# Patient Record
Sex: MALE
[De-identification: names, ages, dates, MRNs, and addresses within clinical notes are randomized per-mention and may not be internally consistent; named-entity substitution may affect disease eponyms.]

---

## 2021-03-17 PROBLEM — Z00.00 ENCOUNTER FOR PREVENTIVE HEALTH EXAMINATION: Status: ACTIVE | Noted: 2021-03-17

## 2021-03-18 ENCOUNTER — APPOINTMENT (OUTPATIENT)
Dept: UROLOGY | Facility: CLINIC | Age: 51
End: 2021-03-18
Payer: COMMERCIAL

## 2021-03-18 VITALS
DIASTOLIC BLOOD PRESSURE: 86 MMHG | BODY MASS INDEX: 24.8 KG/M2 | WEIGHT: 158 LBS | OXYGEN SATURATION: 98 % | HEART RATE: 105 BPM | SYSTOLIC BLOOD PRESSURE: 134 MMHG | HEIGHT: 67 IN

## 2021-03-18 DIAGNOSIS — F17.200 NICOTINE DEPENDENCE, UNSPECIFIED, UNCOMPLICATED: ICD-10-CM

## 2021-03-18 DIAGNOSIS — F52.4 PREMATURE EJACULATION: ICD-10-CM

## 2021-03-18 DIAGNOSIS — Z86.39 PERSONAL HISTORY OF OTHER ENDOCRINE, NUTRITIONAL AND METABOLIC DISEASE: ICD-10-CM

## 2021-03-18 PROCEDURE — 99204 OFFICE O/P NEW MOD 45 MIN: CPT

## 2021-03-18 PROCEDURE — 99072 ADDL SUPL MATRL&STAF TM PHE: CPT

## 2021-03-18 PROCEDURE — 36415 COLL VENOUS BLD VENIPUNCTURE: CPT

## 2021-03-18 NOTE — REVIEW OF SYSTEMS
[both] : pain during and after intercourse [denies] : denies pain with orgasm [Poor quality erections] : Poor quality erections [base] : pain in base of penis [Negative] : Heme/Lymph

## 2021-03-18 NOTE — LETTER BODY
[Dear  ___] : Dear  [unfilled], [Consult Letter:] : I had the pleasure of evaluating your patient, [unfilled]. [( Thank you for referring [unfilled] for consultation for _____ )] : Thank you for referring [unfilled] for consultation for [unfilled] [Please see my note below.] : Please see my note below. [Consult Closing:] : Thank you very much for allowing me to participate in the care of this patient.  If you have any questions, please do not hesitate to contact me. [Sincerely,] : Sincerely, [FreeTextEntry3] : Domo Jones MD\par  of Urology\par [facility]\par \par Offices:\par [facility] @\par [address], [place] [address]\par and\par [address], [place] [address], [address]\par and\par [address] [address]\par \par TEL: [phone]\par FAX: [phone]

## 2021-03-18 NOTE — HISTORY OF PRESENT ILLNESS
[FreeTextEntry1] : 49 yo male presents for Erectile dysfunction. \par For last 5 years has worsening erectile function. \par Rates erections as 0-3/5. Reports decreased libido. Has tried Phosphodiesterase 5 inhibitors in the past- good response. \par Also complaining of premature ejaculation.  \par Denies any difficulty with urination. \par Denies dysuria, hematuria, lower abdominal or flank pain, nausea, vomiting, fever, chills or rigors. \par No family history of Prostate cancer. \par Smoker: 1-2 cigarettes a day for 20 years.

## 2021-03-18 NOTE — ASSESSMENT
[FreeTextEntry1] : Discussed simple steps: Masturbating before intercourse, Use of Condoms, Pelvic floor exercises: Kegel exercises and Pause-squeeze technique. \par Discussed common treatment options for premature ejaculation including behavioral techniques, topical anesthetics and medications: antidepressants like Paxil. \par \par Erectile dysfunction:\par Reviewed pathophysiology and differential diagnosis of erectile dysfunction with the patient. Discussed lifestyle changes. \par The patient was made aware that the current therapies for erectile dysfunction consisting of oral phosphodiesterase type 5 inhibitors, intra-urethral alprostadil, intracavernous vasoactive drug injection, vacuum constriction devices and penile prosthesis implantation. Relative risks and benefits, were discussed. All questions were answered.\par Will get Total, Free and Bio available Testosterone. Will inform results.\par \par Wants to try Cialis/Tadalafil. Discussed proper use. \par Recommended that patient start with 5 mg and take it daily. \par If partial response can increase to 10 mg and up to 20 mg, to be taken as needed and not on 2 consecutive days.\par The patient understands that these medications are not initiators of erection and that he will still require sexual stimulation. \par He was advised to take the medication 30-60 minutes prior to sexual activity and that the efficacy of the medication is decreased following a high-fat meal or concurrent use of alcohol. \par Explained the common adverse effects of therapy including, but not limited to headache, flushing, upset stomach, nasal congestion, abnormal vision, back pain, and myalgia. Asked pt to stop taking the medicine if he develops chest pain, visual or auditory disturbance. \par Explained priapism- if erection does not go down after ejaculation or lasts more than 4 hrs to present to emergency room. \par Asked to use Good Rx coupon, instructions given. \par \par Return to office in 2 months or sooner if any issues.

## 2021-03-18 NOTE — PHYSICAL EXAM
[Normal Appearance] : normal appearance [General Appearance - In No Acute Distress] : no acute distress [FreeTextEntry1] : normal peripheral circulation  [] : no respiratory distress [Abdomen Soft] : soft [Abdomen Tenderness] : non-tender [Costovertebral Angle Tenderness] : no ~M costovertebral angle tenderness [Urethral Meatus] : meatus normal [Penis Abnormality] : normal circumcised penis [Scrotum] : the scrotum was normal [Epididymis] : the epididymides were normal [Testes Tenderness] : no tenderness of the testes [Testes Mass (___cm)] : there were no testicular masses [Normal Station and Gait] : the gait and station were normal for the patient's age [Skin Color & Pigmentation] : normal skin color and pigmentation [No Focal Deficits] : no focal deficits [Oriented To Time, Place, And Person] : oriented to person, place, and time

## 2021-04-13 DIAGNOSIS — E29.1 TESTICULAR HYPOFUNCTION: ICD-10-CM

## 2021-04-13 LAB
TESTOSTERONE FREE/WEAKLY BND: 66.5 NG/DL
TESTOSTERONE TOTAL S: 230 NG/DL
TESTOSTERONE, % FREE/WEAKLY BND: 28.9 %

## 2021-05-14 ENCOUNTER — TRANSCRIPTION ENCOUNTER (OUTPATIENT)
Age: 51
End: 2021-05-14

## 2021-05-17 LAB
FSH SERPL-MCNC: 8.6 IU/L
LH SERPL-ACNC: 8.8 IU/L
PROLACTIN SERPL-MCNC: 9.4 NG/ML

## 2021-05-19 ENCOUNTER — TRANSCRIPTION ENCOUNTER (OUTPATIENT)
Age: 51
End: 2021-05-19

## 2021-05-19 LAB
TESTOSTERONE FREE/WEAKLY BND: 122.4 NG/DL
TESTOSTERONE TOTAL S: 339 NG/DL
TESTOSTERONE, % FREE/WEAKLY BND: 36.1 %

## 2022-06-01 ENCOUNTER — APPOINTMENT (OUTPATIENT)
Dept: UROLOGY | Facility: CLINIC | Age: 52
End: 2022-06-01
Payer: COMMERCIAL

## 2022-06-01 VITALS
SYSTOLIC BLOOD PRESSURE: 122 MMHG | WEIGHT: 165 LBS | DIASTOLIC BLOOD PRESSURE: 83 MMHG | BODY MASS INDEX: 25.9 KG/M2 | HEIGHT: 67 IN | HEART RATE: 93 BPM | OXYGEN SATURATION: 100 %

## 2022-06-01 PROCEDURE — 99213 OFFICE O/P EST LOW 20 MIN: CPT

## 2022-06-02 NOTE — HISTORY OF PRESENT ILLNESS
[FreeTextEntry1] : 53 yo male presents for follow up. \par With Tadalafil 5 mg gets 3/5 response. Takes as needed. \par \par Seen on 3/18/2021 for Erectile dysfunction. \par For last 5 years had worsening erectile function. \par Rated erections as 0-3/5. Reported decreased libido. Had tried Phosphodiesterase 5 inhibitors in the past- good response. \par Also complained of premature ejaculation.  \par Denied any difficulty with urination. \par Denied dysuria, hematuria, lower abdominal or flank pain, nausea, vomiting, fever, chills or rigors. \par No family history of Prostate cancer. \par Smoker: 1-2 cigarettes a day for 20 years.

## 2022-06-02 NOTE — LETTER BODY
[Dear  ___] : Dear  [unfilled], [Courtesy Letter:] : I had the pleasure of seeing your patient, [unfilled], in my office today. [Please see my note below.] : Please see my note below. [Sincerely,] : Sincerely, [FreeTextEntry3] : Domo Jones MD\par  of Urology\par [facility]\par \par Offices:\par [facility] @\par [address], [place] [address]\par and\par [address], [place] [address], [address]\par and\par [address] [address]\par \par TEL: [phone]\par FAX: [phone]

## 2022-06-02 NOTE — ASSESSMENT
[FreeTextEntry1] : Erectile dysfunction:\par Will try Tadalafil 10 mg. \par Wants 5 mg script. \par \par Return to office in 1 year or sooner if any issues.

## 2023-02-28 ENCOUNTER — APPOINTMENT (OUTPATIENT)
Dept: UROLOGY | Facility: CLINIC | Age: 53
End: 2023-02-28

## 2023-03-14 ENCOUNTER — NON-APPOINTMENT (OUTPATIENT)
Age: 53
End: 2023-03-14

## 2023-03-15 ENCOUNTER — APPOINTMENT (OUTPATIENT)
Dept: UROLOGY | Facility: CLINIC | Age: 53
End: 2023-03-15
Payer: COMMERCIAL

## 2023-03-15 VITALS
WEIGHT: 165 LBS | BODY MASS INDEX: 25.9 KG/M2 | TEMPERATURE: 97.6 F | DIASTOLIC BLOOD PRESSURE: 83 MMHG | SYSTOLIC BLOOD PRESSURE: 131 MMHG | HEIGHT: 67 IN

## 2023-03-15 PROCEDURE — 99214 OFFICE O/P EST MOD 30 MIN: CPT | Mod: 25

## 2023-03-15 PROCEDURE — 51798 US URINE CAPACITY MEASURE: CPT

## 2023-03-15 RX ORDER — SULFAMETHOXAZOLE AND TRIMETHOPRIM 800; 160 MG/1; MG/1
800-160 TABLET ORAL TWICE DAILY
Qty: 28 | Refills: 0 | Status: ACTIVE | COMMUNITY
Start: 2023-03-15 | End: 1900-01-01

## 2023-03-15 NOTE — HISTORY OF PRESENT ILLNESS
[FreeTextEntry1] : 53 yo male presents for dysuria. \par For last 3 months has been having dysuria, initially on and off, since last 1 month everyday. \par Was given 10 day course of Ciprofloxacin, no difference, stopped 3 days ago. Urine test was negative for infection. \par Has reasonable stream, daytime frequency is every 2 hours or so and nocturia 0 to 1 x. \par Denies hesitancy, straining, intermittency, urgency, incontinence, sense of incomplete emptying. \par Denies hematuria, lower abdominal or flank pain, nausea, vomiting, fever, chills or rigors. \par With Tadalafil 5 mg gets 3/5 response. Uses PRN. \par \par Past occasional smoker.\par Occupation: [employer]. \par \par Seen on 3/18/2021 for Erectile dysfunction. \par For last 5 years had worsening erectile function. \par Rated erections as 0-3/5. Reported decreased libido. Had tried Phosphodiesterase 5 inhibitors in the past- good response. \par Also complained of premature ejaculation.  \par Denied any difficulty with urination. \par Denied dysuria, hematuria, lower abdominal or flank pain, nausea, vomiting, fever, chills or rigors. \par No family history of Prostate cancer. \par Smoker: 1 to 2 cigarettes a day for 20 years.

## 2023-03-15 NOTE — LETTER BODY
[Dear  ___] : Dear  [unfilled], [Courtesy Letter:] : I had the pleasure of seeing your patient, [unfilled], in my office today. [Please see my note below.] : Please see my note below. [Sincerely,] : Sincerely, [FreeTextEntry3] : Domo Jones MD\par  of Urology\par [facility]\par \par [facility]\par Offices:\par [address], [facility]\par [address] [address]\par [address] [facility], [address]\par \par TEL: [phone]\par FAX: [phone]

## 2023-03-15 NOTE — PHYSICAL EXAM
[Urethral Meatus] : meatus normal [Penis Abnormality] : normal circumcised penis [Scrotum] : the scrotum was normal [Testes Tenderness] : no tenderness of the testes [Testes Mass (___cm)] : there were no testicular masses [No Prostate Nodules] : no prostate nodules [Prostate Size ___ (0-4)] : prostate size [unfilled] (scale: 0-4) [Prostate Tenderness] : was tender [Normal Appearance] : normal appearance [General Appearance - In No Acute Distress] : no acute distress [Abdomen Soft] : soft [Abdomen Tenderness] : non-tender [FreeTextEntry1] : normal peripheral circulation  [] : no respiratory distress [Oriented To Time, Place, And Person] : oriented to person, place, and time [Normal Station and Gait] : the gait and station were normal for the patient's age

## 2023-03-15 NOTE — ASSESSMENT
[FreeTextEntry1] : Dysuria:\par PVR: 79 ml. \par Will get Urinalysis, Urine culture and Urine cytology. \par \par Prostatitis: \par Discussed concern for Prostatitis.  \par Will start Bactrim. \par Recommended to abstain from sexual activity. \par \par Erectile dysfunction:  \par Recommended to try Tadalafil 10 mg PRN. \par \par Return to office in 4 weeks or sooner if any issues: will do Uroflo/PVR.

## 2023-03-16 LAB
APPEARANCE: CLEAR
BACTERIA: NEGATIVE
BILIRUBIN URINE: NEGATIVE
BLOOD URINE: NEGATIVE
COLOR: NORMAL
GLUCOSE QUALITATIVE U: ABNORMAL
HYALINE CASTS: 0 /LPF
KETONES URINE: NEGATIVE
LEUKOCYTE ESTERASE URINE: NEGATIVE
MICROSCOPIC-UA: NORMAL
NITRITE URINE: NEGATIVE
PH URINE: 6
PROTEIN URINE: NEGATIVE
RED BLOOD CELLS URINE: 1 /HPF
SPECIFIC GRAVITY URINE: 1.03
SQUAMOUS EPITHELIAL CELLS: 0 /HPF
URINE CYTOLOGY: NORMAL
UROBILINOGEN URINE: NORMAL
WHITE BLOOD CELLS URINE: 0 /HPF

## 2023-03-17 LAB — BACTERIA UR CULT: NORMAL

## 2023-04-06 ENCOUNTER — APPOINTMENT (OUTPATIENT)
Dept: UROLOGY | Facility: CLINIC | Age: 53
End: 2023-04-06

## 2023-05-25 ENCOUNTER — NON-APPOINTMENT (OUTPATIENT)
Age: 53
End: 2023-05-25

## 2023-05-26 ENCOUNTER — NON-APPOINTMENT (OUTPATIENT)
Age: 53
End: 2023-05-26

## 2023-06-01 ENCOUNTER — APPOINTMENT (OUTPATIENT)
Dept: UROLOGY | Facility: CLINIC | Age: 53
End: 2023-06-01
Payer: COMMERCIAL

## 2023-06-01 VITALS
HEART RATE: 94 BPM | OXYGEN SATURATION: 99 % | BODY MASS INDEX: 25.43 KG/M2 | DIASTOLIC BLOOD PRESSURE: 78 MMHG | SYSTOLIC BLOOD PRESSURE: 124 MMHG | WEIGHT: 162 LBS | HEIGHT: 67 IN

## 2023-06-01 DIAGNOSIS — N41.0 ACUTE PROSTATITIS: ICD-10-CM

## 2023-06-01 PROCEDURE — 51741 ELECTRO-UROFLOWMETRY FIRST: CPT

## 2023-06-01 PROCEDURE — 99214 OFFICE O/P EST MOD 30 MIN: CPT

## 2023-06-01 RX ORDER — DOXYCYCLINE 100 MG/1
100 CAPSULE ORAL TWICE DAILY
Qty: 28 | Refills: 0 | Status: ACTIVE | COMMUNITY
Start: 2023-06-01 | End: 1900-01-01

## 2023-06-04 NOTE — PHYSICAL EXAM
[Normal Appearance] : normal appearance [General Appearance - In No Acute Distress] : no acute distress [Abdomen Tenderness] : non-tender [Abdomen Soft] : soft [FreeTextEntry1] : normal peripheral circulation  [] : no respiratory distress [Oriented To Time, Place, And Person] : oriented to person, place, and time [Normal Station and Gait] : the gait and station were normal for the patient's age

## 2023-06-04 NOTE — ASSESSMENT
[FreeTextEntry1] : Reviewed records.\par Discussed labs.\par \par Benign Prostatic Hyperplasia:\par Erectile dysfunction:\par See Uroflo and PVR.\par Discussed treatment options. Will try Tadalafil 5 mg daily. \par Explained the common adverse effects of therapy including, but not limited to headache, flushing, upset stomach, nasal congestion, abnormal vision, back pain, and myalgia. Asked pt to stop taking the medicine if he develops chest pain, visual or auditory disturbance. Explained priapism- if erection does not go down after ejaculation or lasts more than 4 hrs to present to emergency room. \par \par Dysuria:\par Prostatitis:\par Start Doxycycline. \par Discussed if continues to have bother will do Cystoscopy. \par \par Return to office in 3 months or sooner if any issues.

## 2023-06-04 NOTE — HISTORY OF PRESENT ILLNESS
[FreeTextEntry1] : 54 yo male presents for follow up. \par Was better with Antibiotics and after for 1 month, again has dysuria. \par Otherwise same urination. \par With Bactrim had erectile dysfunction. \par Takes Tadalafil 5 mg as needed and feels warm feeling in the body. \par \par ARACELIS: 3/15/23. \par \par Seen on 3/15/23 for dysuria. \par For last 3 months had been having dysuria, initially on and off, since last 1 month everyday. \par Was given 10 day course of Ciprofloxacin, no difference, stopped 3 days ago. Urine test was negative for infection. \par Had reasonable stream, daytime frequency is every 2 hours or so and nocturia 0 to 1 x. \par Denied hesitancy, straining, intermittency, urgency, incontinence, sense of incomplete emptying. \par Denied hematuria, lower abdominal or flank pain, nausea, vomiting, fever, chills or rigors. \par With Tadalafil 5 mg got 3/5 response. Used PRN. \par \par Past occasional smoker.\par Occupation: [employer]. \par \par Seen on 3/18/2021 for Erectile dysfunction. \par For last 5 years had worsening erectile function. \par Rated erections as 0-3/5. Reported decreased libido. Had tried Phosphodiesterase 5 inhibitors in the past- good response. \par Also complained of premature ejaculation.  \par Denied any difficulty with urination. \par Denied dysuria, hematuria, lower abdominal or flank pain, nausea, vomiting, fever, chills or rigors. \par No family history of Prostate cancer. \par Smoker: 1 to 2 cigarettes a day for 20 years.

## 2023-06-04 NOTE — LETTER BODY
[Dear  ___] : Dear  [unfilled], [Courtesy Letter:] : I had the pleasure of seeing your patient, [unfilled], in my office today. [Please see my note below.] : Please see my note below. [Sincerely,] : Sincerely, [FreeTextEntry3] : Domo Jones MD\par  of Urology\par [facility]\par \par [facility]\par Offices:\par [address] [facility]\par [address] [address]\par [address] [facility], [address]\par \par TEL: [phone]\par FAX: [phone]

## 2023-10-10 ENCOUNTER — APPOINTMENT (OUTPATIENT)
Dept: UROLOGY | Facility: CLINIC | Age: 53
End: 2023-10-10

## 2024-01-11 ENCOUNTER — APPOINTMENT (OUTPATIENT)
Dept: UROLOGY | Facility: CLINIC | Age: 54
End: 2024-01-11

## 2024-04-11 ENCOUNTER — APPOINTMENT (OUTPATIENT)
Dept: UROLOGY | Facility: CLINIC | Age: 54
End: 2024-04-11
Payer: COMMERCIAL

## 2024-04-11 VITALS
OXYGEN SATURATION: 99 % | RESPIRATION RATE: 16 BRPM | DIASTOLIC BLOOD PRESSURE: 62 MMHG | SYSTOLIC BLOOD PRESSURE: 108 MMHG | HEART RATE: 95 BPM | HEIGHT: 67 IN | WEIGHT: 156 LBS | BODY MASS INDEX: 24.48 KG/M2

## 2024-04-11 DIAGNOSIS — N52.9 MALE ERECTILE DYSFUNCTION, UNSPECIFIED: ICD-10-CM

## 2024-04-11 DIAGNOSIS — Z12.5 ENCOUNTER FOR SCREENING FOR MALIGNANT NEOPLASM OF PROSTATE: ICD-10-CM

## 2024-04-11 DIAGNOSIS — R30.0 DYSURIA: ICD-10-CM

## 2024-04-11 DIAGNOSIS — N13.8 BENIGN PROSTATIC HYPERPLASIA WITH LOWER URINARY TRACT SYMPMS: ICD-10-CM

## 2024-04-11 DIAGNOSIS — N40.1 BENIGN PROSTATIC HYPERPLASIA WITH LOWER URINARY TRACT SYMPMS: ICD-10-CM

## 2024-04-11 PROCEDURE — 99214 OFFICE O/P EST MOD 30 MIN: CPT

## 2024-04-11 RX ORDER — TADALAFIL 10 MG/1
10 TABLET, FILM COATED ORAL DAILY
Qty: 45 | Refills: 1 | Status: ACTIVE | COMMUNITY
Start: 2021-03-18 | End: 1900-01-01

## 2024-04-14 PROBLEM — R30.0 DYSURIA: Status: ACTIVE | Noted: 2023-03-15

## 2024-04-14 PROBLEM — N52.9 MALE ERECTILE DISORDER: Status: ACTIVE | Noted: 2024-04-14

## 2024-04-14 PROBLEM — Z12.5 SCREENING PSA (PROSTATE SPECIFIC ANTIGEN): Status: ACTIVE | Noted: 2024-04-14

## 2024-04-14 PROBLEM — N40.1 BPH WITH OBSTRUCTION/LOWER URINARY TRACT SYMPTOMS: Status: ACTIVE | Noted: 2023-06-04

## 2024-04-14 PROBLEM — N52.9 ORGANIC ERECTILE DYSFUNCTION: Noted: 2021-03-18

## 2024-04-14 NOTE — LETTER BODY
[Dear  ___] : Dear  [unfilled], [Courtesy Letter:] : I had the pleasure of seeing your patient, [unfilled], in my office today. [Please see my note below.] : Please see my note below. [Sincerely,] : Sincerely, [FreeTextEntry3] : Domo Jones MD\par   of Urology\par  [facility]\par  \par  [facility]\par  Offices:\par  [address], [facility]\par  [address] [address]\par  [address] [facility], [address]\par  \par  TEL: [phone]\par  FAX: [phone]

## 2024-04-14 NOTE — HISTORY OF PRESENT ILLNESS
[FreeTextEntry1] : Primary care physician: Haleh Mohseni, [facility].   54-year-old male presents for follow up.  Taking Tadalafil 10 mg every other day. Gets good response with erections and has less dysuria.  Reports reasonable stream, urinates 3 to 4 x or so during the day and nocturia of 0-1 x. Denies hesitancy, straining, intermittency, urgency, incontinence or sense of incomplete emptying. Denies hematuria, lower abdominal or flank pain, fever, chills or rigors.  Seen on 3/15/23 for dysuria.  For last 3 months had been having dysuria, initially on and off, since last 1 month everyday.  Was given 10 day course of Ciprofloxacin, no difference, stopped 3 days ago. Urine test was negative for infection.  Had reasonable stream, daytime frequency is every 2 hours or so and nocturia 0 to 1 x.  Denied hesitancy, straining, intermittency, urgency, incontinence, sense of incomplete emptying.  Denied hematuria, lower abdominal or flank pain, nausea, vomiting, fever, chills or rigors.  With Tadalafil 5 mg got 3/5 response. Used PRN.   Past occasional smoker. Occupation: [employer].   Seen on 3/18/2021 for Erectile dysfunction.  For last 5 years had worsening erectile function.  Rated erections as 0-3/5. Reported decreased libido. Had tried Phosphodiesterase 5 inhibitors in the past- good response.  Also complained of premature ejaculation.   Denied any difficulty with urination.  Denied dysuria, hematuria, lower abdominal or flank pain, nausea, vomiting, fever, chills or rigors.  No family history of Prostate cancer.  Smoker: 1 to 2 cigarettes a day for 20 years.

## 2024-04-14 NOTE — ASSESSMENT
[FreeTextEntry1] : Reviewed records. Discussed labs.   6/29/2023: PSA: 0.9 Creatinine: 1.06  Dysuria: Improved.  Benign Prostatic Hyperplasia: No bothersome lower urinary tract symptoms.   Erectile dysfunction: Discussed treatment options. Will continue Tadalafil 10 mg every other day.  PSA screening: Discussed PSA screening and latest recommendations/guidelines: USPTF and AUA. Continue PSA screening.  Mentioned as per comprehensive review and meta-analysis digital rectal exam exhibited a notably low diagnostic value for prostate cancer detection. With suggestion that it could be potentially omitted from prostate cancer screening and early detection strategies.   Return to office in 6 months or sooner if any issues: will do uroflow and check post void residual.

## 2024-10-17 ENCOUNTER — APPOINTMENT (OUTPATIENT)
Dept: UROLOGY | Facility: CLINIC | Age: 54
End: 2024-10-17

## 2025-05-07 ENCOUNTER — NON-APPOINTMENT (OUTPATIENT)
Age: 55
End: 2025-05-07

## 2025-05-09 ENCOUNTER — APPOINTMENT (OUTPATIENT)
Dept: UROLOGY | Facility: CLINIC | Age: 55
End: 2025-05-09

## 2025-05-09 DIAGNOSIS — N52.9 MALE ERECTILE DYSFUNCTION, UNSPECIFIED: ICD-10-CM

## 2025-05-09 PROCEDURE — 99213 OFFICE O/P EST LOW 20 MIN: CPT | Mod: 95
